# Patient Record
Sex: FEMALE | Race: BLACK OR AFRICAN AMERICAN | NOT HISPANIC OR LATINO | Employment: FULL TIME | URBAN - METROPOLITAN AREA
[De-identification: names, ages, dates, MRNs, and addresses within clinical notes are randomized per-mention and may not be internally consistent; named-entity substitution may affect disease eponyms.]

---

## 2023-02-02 PROBLEM — M79.672 ACUTE PAIN OF LEFT FOOT: Status: ACTIVE | Noted: 2023-02-02

## 2023-02-02 PROBLEM — R23.2 HOT FLASHES: Status: ACTIVE | Noted: 2023-02-02

## 2023-02-02 PROBLEM — R39.9 URINARY SYMPTOM OR SIGN: Status: ACTIVE | Noted: 2023-02-02

## 2023-02-02 PROBLEM — J30.9 ALLERGIC RHINITIS: Status: ACTIVE | Noted: 2023-02-02

## 2023-02-02 PROBLEM — R42 VERTIGO: Status: ACTIVE | Noted: 2023-02-02

## 2023-02-02 PROBLEM — J45.901 ACUTE ASTHMA EXACERBATION (HHS-HCC): Status: ACTIVE | Noted: 2023-02-02

## 2023-02-02 PROBLEM — S46.912A STRAIN OF LEFT SHOULDER: Status: ACTIVE | Noted: 2023-02-02

## 2023-02-02 PROBLEM — N89.8 VAGINAL DISCHARGE: Status: ACTIVE | Noted: 2023-02-02

## 2023-02-02 PROBLEM — I10 ESSENTIAL HYPERTENSION: Status: ACTIVE | Noted: 2023-02-02

## 2023-02-02 PROBLEM — L03.90 CELLULITIS: Status: ACTIVE | Noted: 2023-02-02

## 2023-02-02 PROBLEM — S49.90XA SHOULDER INJURY, INITIAL ENCOUNTER: Status: ACTIVE | Noted: 2023-02-02

## 2023-02-02 PROBLEM — T83.32XA IUD STRINGS LOST: Status: ACTIVE | Noted: 2023-02-02

## 2023-02-02 PROBLEM — L03.012: Status: ACTIVE | Noted: 2023-02-02

## 2023-02-02 PROBLEM — N76.0 VAGINITIS: Status: ACTIVE | Noted: 2023-02-02

## 2023-02-02 PROBLEM — L30.9 DERMATITIS: Status: ACTIVE | Noted: 2023-02-02

## 2023-02-02 PROBLEM — R63.5 ABNORMAL WEIGHT GAIN: Status: ACTIVE | Noted: 2023-02-02

## 2023-02-02 PROBLEM — R53.83 FATIGUE: Status: ACTIVE | Noted: 2023-02-02

## 2023-02-02 PROBLEM — M25.50 JOINT PAIN: Status: ACTIVE | Noted: 2023-02-02

## 2023-02-02 PROBLEM — N89.8 VAGINAL PRURITUS: Status: ACTIVE | Noted: 2023-02-02

## 2023-02-02 PROBLEM — B35.9 RINGWORM: Status: ACTIVE | Noted: 2023-02-02

## 2023-02-02 PROBLEM — F17.200 SMOKING: Status: ACTIVE | Noted: 2023-02-02

## 2023-02-02 PROBLEM — R07.9 CHEST PAIN: Status: ACTIVE | Noted: 2023-02-02

## 2023-02-02 PROBLEM — E66.9 OBESITY: Status: ACTIVE | Noted: 2023-02-02

## 2023-02-02 PROBLEM — J06.9 ACUTE UPPER RESPIRATORY INFECTION: Status: ACTIVE | Noted: 2023-02-02

## 2023-02-02 PROBLEM — R06.83 SNORING: Status: ACTIVE | Noted: 2023-02-02

## 2023-02-02 PROBLEM — E55.9 VITAMIN D DEFICIENCY: Status: ACTIVE | Noted: 2023-02-02

## 2023-02-02 PROBLEM — F17.200 SMOKING: Status: RESOLVED | Noted: 2023-02-02 | Resolved: 2023-02-02

## 2023-02-02 PROBLEM — R05.9 COUGH: Status: ACTIVE | Noted: 2023-02-02

## 2023-02-02 PROBLEM — R19.00 ABDOMINAL MASS: Status: ACTIVE | Noted: 2023-02-02

## 2023-02-02 PROBLEM — G56.00 CARPAL TUNNEL SYNDROME: Status: ACTIVE | Noted: 2023-02-02

## 2023-02-02 PROBLEM — R06.2 WHEEZING: Status: ACTIVE | Noted: 2023-02-02

## 2023-02-02 RX ORDER — AMLODIPINE BESYLATE 10 MG/1
1 TABLET ORAL DAILY
COMMUNITY

## 2023-02-02 RX ORDER — LEVONORGESTREL 52 MG/1
1 INTRAUTERINE DEVICE INTRAUTERINE ONCE
COMMUNITY

## 2023-03-16 ENCOUNTER — APPOINTMENT (OUTPATIENT)
Dept: PRIMARY CARE | Facility: CLINIC | Age: 43
End: 2023-03-16

## 2023-12-12 ENCOUNTER — OFFICE VISIT (OUTPATIENT)
Dept: OBSTETRICS AND GYNECOLOGY | Facility: CLINIC | Age: 43
End: 2023-12-12
Payer: COMMERCIAL

## 2023-12-12 VITALS
WEIGHT: 220 LBS | DIASTOLIC BLOOD PRESSURE: 80 MMHG | BODY MASS INDEX: 34.53 KG/M2 | HEIGHT: 67 IN | SYSTOLIC BLOOD PRESSURE: 120 MMHG

## 2023-12-12 DIAGNOSIS — Z01.419 ENCOUNTER FOR ANNUAL ROUTINE GYNECOLOGICAL EXAMINATION: Primary | ICD-10-CM

## 2023-12-12 DIAGNOSIS — T83.32XA INTRAUTERINE CONTRACEPTIVE DEVICE THREADS LOST, INITIAL ENCOUNTER: ICD-10-CM

## 2023-12-12 PROCEDURE — 99386 PREV VISIT NEW AGE 40-64: CPT | Performed by: OBSTETRICS & GYNECOLOGY

## 2023-12-12 PROCEDURE — 3074F SYST BP LT 130 MM HG: CPT | Performed by: OBSTETRICS & GYNECOLOGY

## 2023-12-12 PROCEDURE — 3079F DIAST BP 80-89 MM HG: CPT | Performed by: OBSTETRICS & GYNECOLOGY

## 2023-12-12 PROCEDURE — 88175 CYTOPATH C/V AUTO FLUID REDO: CPT

## 2023-12-12 PROCEDURE — 87624 HPV HI-RISK TYP POOLED RSLT: CPT

## 2023-12-12 ASSESSMENT — PAIN SCALES - GENERAL: PAINLEVEL: 0-NO PAIN

## 2023-12-12 NOTE — PROGRESS NOTES
43-year-old obese -1-4-2 -American woman presents today for annual GYN exam.  She has a Mirena IUD in place.    Subjective   Patient ID: Kaity Horn is a 43 y.o. female who presents for Annual Exam (Pap: 2018 WNL HPV (NEG)/Mammo: 2023 WNL//Chaperon accepted:  Leah Escobar CMA ).  Objective   Physical Exam  Exam conducted with a chaperone present.   Constitutional:       Appearance: She is obese.   HENT:      Head: Normocephalic.      Right Ear: External ear normal.      Left Ear: External ear normal.      Nose: Nose normal.      Mouth/Throat:      Mouth: Mucous membranes are moist.   Eyes:      Extraocular Movements: Extraocular movements intact.      Pupils: Pupils are equal, round, and reactive to light.   Cardiovascular:      Rate and Rhythm: Normal rate.      Heart sounds: Normal heart sounds.   Pulmonary:      Effort: Pulmonary effort is normal.      Breath sounds: Normal breath sounds.   Chest:   Breasts:     Right: Normal. No mass or skin change.      Left: Normal. No mass or skin change.   Abdominal:      Palpations: Abdomen is soft.   Genitourinary:     General: Normal vulva.      Labia:         Right: No lesion.         Left: No lesion.       Vagina: Normal.      Cervix: No cervical motion tenderness or lesion.      Uterus: Normal. Not enlarged and not tender.       Adnexa: Right adnexa normal and left adnexa normal.        Right: No mass, tenderness or fullness.          Left: No mass, tenderness or fullness.        Comments: IUD strings not seen  Uterus enlarged and consistent with fibroids  Musculoskeletal:         General: Normal range of motion.      Cervical back: Normal range of motion and neck supple.   Skin:     General: Skin is warm and dry.   Neurological:      General: No focal deficit present.      Mental Status: She is alert and oriented to person, place, and time.      Cranial Nerves: No cranial nerve deficit.   Psychiatric:         Mood and Affect: Mood normal.          Behavior: Behavior normal.         Thought Content: Thought content normal.         Judgment: Judgment normal.       A/P: APE     -  Pap sent     -  Mammogram     -  Colonoscopy     -  PCP F/U     -  US for IUD placement

## 2023-12-12 NOTE — PATIENT INSTRUCTIONS
Thanks for coming in today for your annual GYN exam.    A Pap smear was sent.  Results should be available in the next few weeks.  However, if you have been unable to review the results by the middle of the next month please give the office a call.    Arrange to have a mammogram performed once a year.    Follow-up with your PCP and other healthcare specialist as needed.    Have an ultrasound performed to check your IUD placement.  Please use a backup form of contraception if you are sexually active before this can be performed.    Arrange to have a colonoscopy performed.    Feel free to call the office with any problems, questions or concerns prior to your next scheduled visit.

## 2023-12-20 ENCOUNTER — HOSPITAL ENCOUNTER (OUTPATIENT)
Dept: RADIOLOGY | Facility: CLINIC | Age: 43
Discharge: HOME | End: 2023-12-20
Payer: COMMERCIAL

## 2023-12-20 DIAGNOSIS — T83.32XA INTRAUTERINE CONTRACEPTIVE DEVICE THREADS LOST, INITIAL ENCOUNTER: ICD-10-CM

## 2023-12-20 PROCEDURE — 76856 US EXAM PELVIC COMPLETE: CPT | Performed by: RADIOLOGY

## 2023-12-20 PROCEDURE — 76830 TRANSVAGINAL US NON-OB: CPT | Performed by: RADIOLOGY

## 2023-12-20 PROCEDURE — 76856 US EXAM PELVIC COMPLETE: CPT

## 2024-01-02 LAB
CYTOLOGY CMNT CVX/VAG CYTO-IMP: NORMAL
HPV HR 12 DNA GENITAL QL NAA+PROBE: NEGATIVE
HPV HR GENOTYPES PNL CVX NAA+PROBE: NEGATIVE
HPV16 DNA SPEC QL NAA+PROBE: NEGATIVE
HPV18 DNA SPEC QL NAA+PROBE: NEGATIVE
LAB AP CONTRACEPTIVE HISTORY: NORMAL
LAB AP HPV GENOTYPE QUESTION: YES
LAB AP HPV HR: NORMAL
LABORATORY COMMENT REPORT: NORMAL
PATH REPORT.TOTAL CANCER: NORMAL

## 2024-06-03 ENCOUNTER — HOSPITAL ENCOUNTER (EMERGENCY)
Facility: HOSPITAL | Age: 44
Discharge: LEFT BEFORE DISCHARGE | End: 2024-06-04
Payer: COMMERCIAL

## 2024-06-03 VITALS
DIASTOLIC BLOOD PRESSURE: 69 MMHG | TEMPERATURE: 97.3 F | WEIGHT: 215 LBS | HEART RATE: 95 BPM | HEIGHT: 67 IN | BODY MASS INDEX: 33.74 KG/M2 | RESPIRATION RATE: 16 BRPM | SYSTOLIC BLOOD PRESSURE: 135 MMHG | OXYGEN SATURATION: 100 %

## 2024-06-03 PROCEDURE — 99285 EMERGENCY DEPT VISIT HI MDM: CPT | Performed by: PHYSICIAN ASSISTANT

## 2024-06-03 PROCEDURE — 4500999001 HC ED NO CHARGE

## 2024-06-03 ASSESSMENT — COLUMBIA-SUICIDE SEVERITY RATING SCALE - C-SSRS
6. HAVE YOU EVER DONE ANYTHING, STARTED TO DO ANYTHING, OR PREPARED TO DO ANYTHING TO END YOUR LIFE?: NO
1. IN THE PAST MONTH, HAVE YOU WISHED YOU WERE DEAD OR WISHED YOU COULD GO TO SLEEP AND NOT WAKE UP?: NO
2. HAVE YOU ACTUALLY HAD ANY THOUGHTS OF KILLING YOURSELF?: NO

## 2024-06-03 NOTE — ED TRIAGE NOTES
Patient states that she has been having intermittant dizziness.  She states that today she was feeling dizzy and shaky..   states that this has been ongoing for a few weeks.  Here for joey.

## 2024-06-04 ENCOUNTER — HOSPITAL ENCOUNTER (EMERGENCY)
Facility: HOSPITAL | Age: 44
Discharge: HOME | End: 2024-06-04
Payer: COMMERCIAL

## 2024-06-04 ENCOUNTER — APPOINTMENT (OUTPATIENT)
Dept: RADIOLOGY | Facility: HOSPITAL | Age: 44
End: 2024-06-04
Payer: COMMERCIAL

## 2024-06-04 ENCOUNTER — CLINICAL SUPPORT (OUTPATIENT)
Dept: EMERGENCY MEDICINE | Facility: HOSPITAL | Age: 44
End: 2024-06-04
Payer: COMMERCIAL

## 2024-06-04 VITALS
OXYGEN SATURATION: 100 % | HEART RATE: 100 BPM | DIASTOLIC BLOOD PRESSURE: 94 MMHG | WEIGHT: 215 LBS | BODY MASS INDEX: 33.74 KG/M2 | TEMPERATURE: 98 F | RESPIRATION RATE: 17 BRPM | HEIGHT: 67 IN | SYSTOLIC BLOOD PRESSURE: 145 MMHG

## 2024-06-04 DIAGNOSIS — R42 LIGHTHEADEDNESS: Primary | ICD-10-CM

## 2024-06-04 DIAGNOSIS — R53.1 WEAKNESS GENERALIZED: ICD-10-CM

## 2024-06-04 LAB
ALBUMIN SERPL BCP-MCNC: 4.2 G/DL (ref 3.4–5)
ALP SERPL-CCNC: 45 U/L (ref 33–110)
ALT SERPL W P-5'-P-CCNC: 59 U/L (ref 7–45)
ANION GAP SERPL CALC-SCNC: 17 MMOL/L (ref 10–20)
AST SERPL W P-5'-P-CCNC: 64 U/L (ref 9–39)
BASOPHILS # BLD AUTO: 0.03 X10*3/UL (ref 0–0.1)
BASOPHILS NFR BLD AUTO: 0.6 %
BILIRUB SERPL-MCNC: 1.1 MG/DL (ref 0–1.2)
BUN SERPL-MCNC: 8 MG/DL (ref 6–23)
CALCIUM SERPL-MCNC: 9.7 MG/DL (ref 8.6–10.6)
CHLORIDE SERPL-SCNC: 100 MMOL/L (ref 98–107)
CO2 SERPL-SCNC: 26 MMOL/L (ref 21–32)
CREAT SERPL-MCNC: 0.83 MG/DL (ref 0.5–1.05)
EGFRCR SERPLBLD CKD-EPI 2021: 89 ML/MIN/1.73M*2
EOSINOPHIL # BLD AUTO: 0.18 X10*3/UL (ref 0–0.7)
EOSINOPHIL NFR BLD AUTO: 3.9 %
ERYTHROCYTE [DISTWIDTH] IN BLOOD BY AUTOMATED COUNT: 13.2 % (ref 11.5–14.5)
GLUCOSE SERPL-MCNC: 82 MG/DL (ref 74–99)
HCT VFR BLD AUTO: 39.2 % (ref 36–46)
HETEROPH AB SERPLBLD QL IA.RAPID: NEGATIVE
HGB BLD-MCNC: 13.9 G/DL (ref 12–16)
IMM GRANULOCYTES # BLD AUTO: 0.01 X10*3/UL (ref 0–0.7)
IMM GRANULOCYTES NFR BLD AUTO: 0.2 % (ref 0–0.9)
LYMPHOCYTES # BLD AUTO: 1.4 X10*3/UL (ref 1.2–4.8)
LYMPHOCYTES NFR BLD AUTO: 30.2 %
MAGNESIUM SERPL-MCNC: 1.72 MG/DL (ref 1.6–2.4)
MCH RBC QN AUTO: 35 PG (ref 26–34)
MCHC RBC AUTO-ENTMCNC: 35.5 G/DL (ref 32–36)
MCV RBC AUTO: 99 FL (ref 80–100)
MONOCYTES # BLD AUTO: 0.49 X10*3/UL (ref 0.1–1)
MONOCYTES NFR BLD AUTO: 10.6 %
NEUTROPHILS # BLD AUTO: 2.52 X10*3/UL (ref 1.2–7.7)
NEUTROPHILS NFR BLD AUTO: 54.5 %
NRBC BLD-RTO: 0 /100 WBCS (ref 0–0)
PLATELET # BLD AUTO: 188 X10*3/UL (ref 150–450)
POTASSIUM SERPL-SCNC: 3.9 MMOL/L (ref 3.5–5.3)
PROT SERPL-MCNC: 8 G/DL (ref 6.4–8.2)
RBC # BLD AUTO: 3.97 X10*6/UL (ref 4–5.2)
SODIUM SERPL-SCNC: 139 MMOL/L (ref 136–145)
TSH SERPL-ACNC: 2.27 MIU/L (ref 0.44–3.98)
WBC # BLD AUTO: 4.6 X10*3/UL (ref 4.4–11.3)

## 2024-06-04 PROCEDURE — 93005 ELECTROCARDIOGRAM TRACING: CPT

## 2024-06-04 PROCEDURE — 36415 COLL VENOUS BLD VENIPUNCTURE: CPT | Performed by: PHYSICIAN ASSISTANT

## 2024-06-04 PROCEDURE — 86308 HETEROPHILE ANTIBODY SCREEN: CPT | Performed by: PHYSICIAN ASSISTANT

## 2024-06-04 PROCEDURE — 84075 ASSAY ALKALINE PHOSPHATASE: CPT | Performed by: PHYSICIAN ASSISTANT

## 2024-06-04 PROCEDURE — 70450 CT HEAD/BRAIN W/O DYE: CPT | Performed by: RADIOLOGY

## 2024-06-04 PROCEDURE — 83735 ASSAY OF MAGNESIUM: CPT | Performed by: PHYSICIAN ASSISTANT

## 2024-06-04 PROCEDURE — 85025 COMPLETE CBC W/AUTO DIFF WBC: CPT | Performed by: PHYSICIAN ASSISTANT

## 2024-06-04 PROCEDURE — 99285 EMERGENCY DEPT VISIT HI MDM: CPT | Mod: 25

## 2024-06-04 PROCEDURE — 84443 ASSAY THYROID STIM HORMONE: CPT | Performed by: PHYSICIAN ASSISTANT

## 2024-06-04 PROCEDURE — 70450 CT HEAD/BRAIN W/O DYE: CPT

## 2024-06-04 ASSESSMENT — COLUMBIA-SUICIDE SEVERITY RATING SCALE - C-SSRS
1. IN THE PAST MONTH, HAVE YOU WISHED YOU WERE DEAD OR WISHED YOU COULD GO TO SLEEP AND NOT WAKE UP?: NO
2. HAVE YOU ACTUALLY HAD ANY THOUGHTS OF KILLING YOURSELF?: NO
6. HAVE YOU EVER DONE ANYTHING, STARTED TO DO ANYTHING, OR PREPARED TO DO ANYTHING TO END YOUR LIFE?: NO

## 2024-06-04 ASSESSMENT — PAIN - FUNCTIONAL ASSESSMENT: PAIN_FUNCTIONAL_ASSESSMENT: 0-10

## 2024-06-04 ASSESSMENT — PAIN SCALES - GENERAL: PAINLEVEL_OUTOF10: 0 - NO PAIN

## 2024-06-04 NOTE — DISCHARGE INSTRUCTIONS
Your liver enzymes are mildly elevated, this could be secondary to alcohol use, recommend discontinuation and slowly weaning yourself off.  This could also be secondary to mono, follow-up on your results via MyChart.

## 2024-06-04 NOTE — ED PROVIDER NOTES
"HPI   Chief Complaint   Patient presents with    Dizziness       Patient is a 43-year-old female with history of asthma, cellulitis, cough, hypertension, ringworm who presents today for evaluation of generalized weakness, difficulty sleeping, intermittent nausea and dizziness as well as lethargy.  She also had an episode of left hand tremoring that occurred yesterday.  Patient states has been experiencing most of the symptoms for the past 6 months, she describes them as \"lethargic\" and like \"she does not feel like herself\".  Patient denies any chest pain shortness of breath, vomiting, diarrhea, abdominal pain.  Denies any urinary symptoms.  Patient denies any unexplained weight gain or weight loss but states she has been watching her weight for the last year and has therefore lost an expected amount of weight, states she is eating between 1500 to 1800 matthew/day.  Patient states she is eating a balanced diet.  Patient states she has difficulty falling asleep without the use of melatonin.  Patient states that right before going to bed she feels dizzy, she describes it as a spinning sensation however this only occurs before sleep but not otherwise.  Patient denies any fevers or chills, urinary symptoms, URI symptoms.  Denies any chest pain or shortness of breath.  Denies any unexplained hair loss.  She states yesterday she was sitting in her car and her left hand started trembling, she states she has had it before on each hand.  Patient believes her something neurological going on.  She is not currently experiencing the trembling currently.                          Little Coma Scale Score: 15                     Patient History   Past Medical History:   Diagnosis Date    Allergy status to unspecified drugs, medicaments and biological substances     History of seasonal allergies    Displacement of intrauterine contraceptive device, initial encounter     IUD strings lost    Encounter for screening for infections with a " predominantly sexual mode of transmission 10/18/2022    Screening for STDs (sexually transmitted diseases)    Morbid (severe) obesity due to excess calories (Multi)     Morbid obesity    Personal history of malignant neoplasm of other female genital organs     Personal history of malignant neoplasm of vulva    Personal history of other diseases of the respiratory system     Personal history of asthma    Personal history of other specified conditions 07/06/2020    History of edema    Personal history of other specified conditions 07/06/2020    History of headache    Unspecified asthma, uncomplicated ([facility])     Childhood asthma     Past Surgical History:   Procedure Laterality Date    APPENDECTOMY  02/28/2014    Appendectomy    OTHER SURGICAL HISTORY  02/28/2014    Ablation Of Vulvar Lesion(S)    SMALL INTESTINE SURGERY  02/28/2014    Small Bowel Resection     Family History   Problem Relation Name Age of Onset    Other (Cardiac disorder) Mother      Hypertension Mother      Hypertension Father      Stroke Paternal Grandmother       Social History     Tobacco Use    Smoking status: Every Day     Types: Cigarettes    Smokeless tobacco: Never   Substance Use Topics    Alcohol use: Yes    Drug use: Never       Physical Exam   ED Triage Vitals [06/04/24 0947]   Temperature Heart Rate Respirations BP   36.7 °C (98 °F) 100 17 (!) 145/94      Pulse Ox Temp Source Heart Rate Source Patient Position   100 % Oral -- --      BP Location FiO2 (%)     -- --       Physical Exam  Vitals and nursing note reviewed.   Constitutional:       General: She is not in acute distress.     Appearance: Normal appearance. She is not toxic-appearing.   HENT:      Head: Normocephalic and atraumatic.      Nose: Nose normal.   Eyes:      Extraocular Movements: Extraocular movements intact.   Cardiovascular:      Rate and Rhythm: Normal rate and regular rhythm.   Pulmonary:      Effort: Pulmonary effort is normal.      Breath sounds: Normal breath  sounds.   Abdominal:      Palpations: Abdomen is soft.   Musculoskeletal:         General: Normal range of motion.      Cervical back: Normal range of motion and neck supple.   Skin:     General: Skin is warm and dry.   Neurological:      General: No focal deficit present.      Mental Status: She is alert and oriented to person, place, and time.      GCS: GCS eye subscore is 4. GCS verbal subscore is 5. GCS motor subscore is 6.      Cranial Nerves: Cranial nerves 2-12 are intact. No cranial nerve deficit, dysarthria or facial asymmetry.      Sensory: Sensation is intact. No sensory deficit.      Motor: Motor function is intact. No weakness.      Comments: No horizontal nystagmus   Psychiatric:         Mood and Affect: Mood normal.         Thought Content: Thought content normal.       CT head wo IV contrast   Final Result   There is no hyperdense acute intracranial hemorrhage or definite   acute cortical infarction.        MACRO:   None.        Signed by: Milton Crespo 6/4/2024 11:26 AM   Dictation workstation:   [id number]        Labs Reviewed   CBC WITH AUTO DIFFERENTIAL - Abnormal       Result Value    WBC 4.6      nRBC 0.0      RBC 3.97 (*)     Hemoglobin 13.9      Hematocrit 39.2      MCV 99      MCH 35.0 (*)     MCHC 35.5      RDW 13.2      Platelets 188      Neutrophils % 54.5      Immature Granulocytes %, Automated 0.2      Lymphocytes % 30.2      Monocytes % 10.6      Eosinophils % 3.9      Basophils % 0.6      Neutrophils Absolute 2.52      Immature Granulocytes Absolute, Automated 0.01      Lymphocytes Absolute 1.40      Monocytes Absolute 0.49      Eosinophils Absolute 0.18      Basophils Absolute 0.03     COMPREHENSIVE METABOLIC PANEL - Abnormal    Glucose 82      Sodium 139      Potassium 3.9      Chloride 100      Bicarbonate 26      Anion Gap 17      Urea Nitrogen 8      Creatinine 0.83      eGFR 89      Calcium 9.7      Albumin 4.2      Alkaline Phosphatase 45      Total Protein 8.0      AST 64 (*)      Bilirubin, Total 1.1      ALT 59 (*)    MAGNESIUM - Normal    Magnesium 1.72     TSH WITH REFLEX TO FREE T4 IF ABNORMAL - Normal    Thyroid Stimulating Hormone 2.27      Narrative:     TSH testing is performed using different testing methodology at [facility] than at other [facility] hospitals. Direct result comparisons should only be made within the same method.     MONONUCLEOSIS SCREEN (HETEROPHILE ANTIBODY) - Normal    Mononucleosis Screen Negative           ED Course & MDM   ED Course as of 06/04/24 1550   Tue Jun 04, 2024   1054 ECG 12 lead  NSR rate of 96, , QRS 82, QTc 480, normal axis, no ischemic changes. [MK]   1236 Eosinophils %: 3.9    Patient updated on results and plan of care, I did notify her of her mildly elevated LFTs, patient states she does drink alcohol about 4 days a week and does drink a pretty significant amount, she is notified that this could be related, Monospot is currently pending however given this and this would not  patient would prefer to follow-up on this on her MyChart results.  She has PCP follow-up. [MK]      ED Course User Index  [MK] Tamica Gallego PA-C         Diagnoses as of 06/04/24 1550   Lightheadedness   Weakness generalized       Medical Decision Making  MDM: Patient is a 43-year-old female who presents today for evaluation of nonspecific symptoms of dizziness which she more so describes as lightheadedness and not vertigo.  Patient has no acute symptoms, all of her symptoms have been going on for 6 months with the exception of the left hand trauma that occurred while she was sitting in the car yesterday.  She denies headaches, denies head trauma, she has no cardiac symptoms of chest pain or shortness of breath.  I did initiate a basic workup including checking TSH, CBC CMP magnesium CT head on this patient, her EKG is nonischemic. Patient CBC and CMP are unremarkable aside from mildly elevated AST and ALT, I did add on a Monospot  which was subsequently negative, patient does drink alcohol, states she drinks about 4 days a week and drinks a large amount, discussed with her that this could be related to that and not actually the result of her symptoms, her magnesium and TSH are both normal, CT head is negative.  As I discussed with the patient, given chronicity of symptoms, there may be additional tests are warranted however it is important to follow-up with the PCP.  I do not suspect any acute or emergent process requiring any further workup, feel that she can safely be discharged at this time.          Procedure  Procedures     Tamica Gallego PA-C  06/04/24 0467

## 2024-06-04 NOTE — ED TRIAGE NOTES
Pt presents with nausea, dizziness, lethargy and trembling hands that started months ago, worsening on yesterday. LWBS on yesterday from [facility] after waiting a long time to seen

## 2024-06-05 LAB
ATRIAL RATE: 96 BPM
P AXIS: 38 DEGREES
P OFFSET: 183 MS
P ONSET: 139 MS
PR INTERVAL: 158 MS
Q ONSET: 218 MS
QRS COUNT: 16 BEATS
QRS DURATION: 82 MS
QT INTERVAL: 380 MS
QTC CALCULATION(BAZETT): 480 MS
QTC FREDERICIA: 444 MS
R AXIS: -7 DEGREES
T AXIS: 2 DEGREES
T OFFSET: 408 MS
VENTRICULAR RATE: 96 BPM

## 2024-11-25 ENCOUNTER — OFFICE VISIT (OUTPATIENT)
Dept: PRIMARY CARE | Facility: CLINIC | Age: 44
End: 2024-11-25
Payer: COMMERCIAL

## 2024-11-25 VITALS
DIASTOLIC BLOOD PRESSURE: 70 MMHG | HEIGHT: 67 IN | BODY MASS INDEX: 33.67 KG/M2 | SYSTOLIC BLOOD PRESSURE: 160 MMHG | HEART RATE: 92 BPM

## 2024-11-25 DIAGNOSIS — F51.04 CHRONIC INSOMNIA: ICD-10-CM

## 2024-11-25 DIAGNOSIS — Z72.0 TOBACCO ABUSE: ICD-10-CM

## 2024-11-25 DIAGNOSIS — R42 DIZZINESS: Primary | ICD-10-CM

## 2024-11-25 DIAGNOSIS — Z13.6 ENCOUNTER FOR SCREENING FOR CORONARY ARTERY DISEASE: ICD-10-CM

## 2024-11-25 DIAGNOSIS — F10.10 ALCOHOL ABUSE: ICD-10-CM

## 2024-11-25 DIAGNOSIS — I10 ESSENTIAL HYPERTENSION: ICD-10-CM

## 2024-11-25 DIAGNOSIS — R74.8 ABNORMAL LIVER ENZYMES: ICD-10-CM

## 2024-11-25 DIAGNOSIS — L70.0 ACNE VULGARIS: ICD-10-CM

## 2024-11-25 PROCEDURE — 99204 OFFICE O/P NEW MOD 45 MIN: CPT | Performed by: INTERNAL MEDICINE

## 2024-11-25 PROCEDURE — 3078F DIAST BP <80 MM HG: CPT | Performed by: INTERNAL MEDICINE

## 2024-11-25 PROCEDURE — 4004F PT TOBACCO SCREEN RCVD TLK: CPT | Performed by: INTERNAL MEDICINE

## 2024-11-25 PROCEDURE — 3077F SYST BP >= 140 MM HG: CPT | Performed by: INTERNAL MEDICINE

## 2024-11-25 RX ORDER — LANOLIN ALCOHOL/MO/W.PET/CERES
100 CREAM (GRAM) TOPICAL DAILY
Qty: 90 TABLET | Refills: 0 | Status: SHIPPED | OUTPATIENT
Start: 2024-11-25 | End: 2025-11-25

## 2024-11-25 RX ORDER — SPIRONOLACTONE 50 MG/1
50 TABLET, FILM COATED ORAL DAILY
COMMUNITY

## 2024-11-25 RX ORDER — HYDROXYZINE HYDROCHLORIDE 25 MG/1
25 TABLET, FILM COATED ORAL NIGHTLY PRN
Qty: 30 TABLET | Refills: 1 | Status: SHIPPED | OUTPATIENT
Start: 2024-11-25 | End: 2024-12-05

## 2024-11-25 RX ORDER — AMLODIPINE BESYLATE 5 MG/1
5 TABLET ORAL DAILY
Qty: 30 TABLET | Refills: 2 | Status: SHIPPED | OUTPATIENT
Start: 2024-11-25 | End: 2025-11-25

## 2024-11-25 RX ORDER — FOLIC ACID 1 MG/1
1 TABLET ORAL DAILY
Qty: 90 TABLET | Refills: 1 | Status: SHIPPED | OUTPATIENT
Start: 2024-11-25 | End: 2025-11-25

## 2024-11-25 NOTE — PROGRESS NOTES
"Subjective   Patient ID: Kaity Horn is a 44 y.o. female who presents for Dizziness.    HPI   Kaity is a 44-year-old -American female who comes to establish primary care.  She has history of hypertension for which she was prescribed amlodipine by her previous provider but she has not been taking this since she was prescribed spironolactone by her dermatologist for acne.  The main reason for patient's visit today is dizziness that has been occurring on and off for over a year.  One of the episodes led to an ED visit in June this year at which time labs done as well as CT brain were unremarkable except for slightly elevated liver enzymes.  Patient denies fever, chills, chest pain, shortness of breath, cough, palpitations, syncope, abdominal pain, nausea, vomiting, diarrhea, melena, rectal bleeding, dysuria, hematuria or mood issues.  She lives alone and does not report any stress in her life currently.  However, patient has been struggling with chronic insomnia for which she has gotten used to drinking alcohol-she has 2 drinks of vodka on a nightly basis.    Patient is not clear as to the reason of her insomnia and denies feeling anxious or depressed.  She has been smoking half a pack of cigarettes a day for about 20 years and has been counseled to quit.  Family history significant for mother with?  Valvular heart disease.  Review of Systems  As per [facility].  Objective   /70 (BP Location: Right arm, Patient Position: Sitting, BP Cuff Size: Large adult)   Pulse 92   Ht 1.702 m (5' 7\")   BMI 33.67 kg/m²     Physical Exam  General - well developed, well appearing, obese, middle-aged -American female in no acute respiratory distress  Eyes - normal sclera and conjunctiva with no pallor or icterus, normal extraocular movements  ENT - normal external auditory canals and tympanic membranes, throat clear with no exudates  Neck - No JVD, thyromegaly or lymphadenopathy, no carotid bruits  Lungs - no " respiratory distress and lungs clear to auscultation bilaterally  Heart - normal S1, S2 with normal heart rate, rhythm and no murmurs   Abdomen - soft, nontender with no masses or organomegaly  Extremities - no cyanosis or pedal edema  Neuro - grossly normal neuro exam with no focal neuro deficits  Psych - normal mental status, mood and affect   Skin - no rashes or ulcers  MSK - normal gait with grossly normal ROM of major joints  Assessment/Plan        1.  Dizziness, chronic-this has been going on for over a year, labs will be repeated (CMP, CBC, vitamin B12 and vitamin D will be ordered), patient has been advised to stop drinking alcohol on a nightly basis, I have advised her to start taking daily thiamine as well as folic acid  2D echo will be ordered to rule out valvular heart disease  2.  Hypertension-amlodipine will be resumed at a dose of 5 mg daily  3.  Elevated liver enzymes-LFTs will be repeated, patient has been counseled to stop drinking alcohol on a nightly basis  4.  Chronic insomnia-hydroxyzine 25 mg nightly will be prescribed  5.  Alcohol abuse-liver enzymes will be repeated, patient has been counseled to stop drinking alcohol on a nightly basis, thiamine and folic acid will be started  6.  Acne vulgaris-patient is on spironolactone  7.  Tobacco abuse-patient has been counseled to cut back on smoking cigarettes  8.  Screening for CAD-CT cardiac scoring will be ordered  Follow-up in 4 to 6 weeks.  45 minutes spent rooming the patient, reviewing records, eliciting history, examining patient, counseling, coordination of care and in documentation.  This note was partially generated using the Dragon voice recognition system. There may be some incorrect words, spelling and punctuation errors that were not corrected prior to committing the note to the patient's medical record.

## 2024-12-06 ENCOUNTER — ANCILLARY PROCEDURE (OUTPATIENT)
Dept: CARDIOLOGY | Facility: CLINIC | Age: 44
End: 2024-12-06
Payer: COMMERCIAL

## 2024-12-06 DIAGNOSIS — R42 DIZZINESS: ICD-10-CM

## 2024-12-06 LAB
AORTIC VALVE PEAK VELOCITY: 1.45 M/S
AV PEAK GRADIENT: 8 MMHG
AVA (PEAK VEL): 2.74 CM2
EJECTION FRACTION APICAL 4 CHAMBER: 54.9
EJECTION FRACTION: 54 %
LEFT ATRIUM VOLUME AREA LENGTH INDEX BSA: 28.4 ML/M2
LEFT VENTRICLE INTERNAL DIMENSION DIASTOLE: 4.6 CM (ref 3.5–6)
LEFT VENTRICULAR OUTFLOW TRACT DIAMETER: 1.98 CM
MITRAL VALVE E/A RATIO: 0.9
RIGHT VENTRICLE FREE WALL PEAK S': 14 CM/S
TRICUSPID ANNULAR PLANE SYSTOLIC EXCURSION: 2.2 CM

## 2024-12-06 PROCEDURE — 93306 TTE W/DOPPLER COMPLETE: CPT

## 2024-12-06 PROCEDURE — 93306 TTE W/DOPPLER COMPLETE: CPT | Performed by: INTERNAL MEDICINE

## 2024-12-18 DIAGNOSIS — I10 ESSENTIAL HYPERTENSION: ICD-10-CM

## 2024-12-18 RX ORDER — AMLODIPINE BESYLATE 5 MG/1
5 TABLET ORAL DAILY
Qty: 90 TABLET | Refills: 0 | Status: SHIPPED | OUTPATIENT
Start: 2024-12-18

## 2024-12-20 ENCOUNTER — HOSPITAL ENCOUNTER (OUTPATIENT)
Dept: RADIOLOGY | Facility: CLINIC | Age: 44
Discharge: HOME | End: 2024-12-20
Payer: COMMERCIAL

## 2024-12-20 VITALS — HEIGHT: 67 IN | BODY MASS INDEX: 33.9 KG/M2 | WEIGHT: 216 LBS

## 2024-12-20 DIAGNOSIS — Z12.31 ENCOUNTER FOR SCREENING MAMMOGRAM FOR MALIGNANT NEOPLASM OF BREAST: ICD-10-CM

## 2024-12-20 DIAGNOSIS — Z01.419 ENCOUNTER FOR ANNUAL ROUTINE GYNECOLOGICAL EXAMINATION: ICD-10-CM

## 2024-12-20 PROCEDURE — 77067 SCR MAMMO BI INCL CAD: CPT

## 2025-01-02 ENCOUNTER — APPOINTMENT (OUTPATIENT)
Dept: PRIMARY CARE | Facility: CLINIC | Age: 45
End: 2025-01-02
Payer: COMMERCIAL

## 2025-02-16 DIAGNOSIS — F10.10 ALCOHOL ABUSE: ICD-10-CM

## 2025-02-17 RX ORDER — LANOLIN ALCOHOL/MO/W.PET/CERES
100 CREAM (GRAM) TOPICAL DAILY
Qty: 90 TABLET | Refills: 0 | OUTPATIENT
Start: 2025-02-17

## 2025-02-20 DIAGNOSIS — F10.10 ALCOHOL ABUSE: ICD-10-CM

## 2025-02-20 RX ORDER — LANOLIN ALCOHOL/MO/W.PET/CERES
100 CREAM (GRAM) TOPICAL DAILY
Qty: 90 TABLET | Refills: 0 | Status: SHIPPED | OUTPATIENT
Start: 2025-02-20

## 2025-02-26 ENCOUNTER — HOSPITAL ENCOUNTER (OUTPATIENT)
Dept: RADIOLOGY | Facility: HOSPITAL | Age: 45
Discharge: HOME | End: 2025-02-26
Payer: COMMERCIAL

## 2025-02-26 DIAGNOSIS — Z13.6 ENCOUNTER FOR SCREENING FOR CORONARY ARTERY DISEASE: ICD-10-CM

## 2025-02-26 PROCEDURE — 75571 CT HRT W/O DYE W/CA TEST: CPT

## 2025-04-02 ENCOUNTER — OFFICE VISIT (OUTPATIENT)
Dept: URGENT CARE | Age: 45
End: 2025-04-02
Payer: COMMERCIAL

## 2025-04-02 VITALS
WEIGHT: 215 LBS | HEIGHT: 66 IN | BODY MASS INDEX: 34.55 KG/M2 | TEMPERATURE: 97.5 F | RESPIRATION RATE: 17 BRPM | HEART RATE: 95 BPM | SYSTOLIC BLOOD PRESSURE: 150 MMHG | DIASTOLIC BLOOD PRESSURE: 97 MMHG

## 2025-04-02 DIAGNOSIS — M10.9 GOUTY ARTHRITIS OF LEFT GREAT TOE: Primary | ICD-10-CM

## 2025-04-02 PROCEDURE — 3077F SYST BP >= 140 MM HG: CPT | Performed by: PHYSICIAN ASSISTANT

## 2025-04-02 PROCEDURE — 3008F BODY MASS INDEX DOCD: CPT | Performed by: PHYSICIAN ASSISTANT

## 2025-04-02 PROCEDURE — 3080F DIAST BP >= 90 MM HG: CPT | Performed by: PHYSICIAN ASSISTANT

## 2025-04-02 PROCEDURE — 99213 OFFICE O/P EST LOW 20 MIN: CPT | Performed by: PHYSICIAN ASSISTANT

## 2025-04-02 RX ORDER — COLCHICINE 0.6 MG/1
TABLET ORAL
Qty: 6 TABLET | Refills: 0 | Status: SHIPPED | OUTPATIENT
Start: 2025-04-02

## 2025-04-02 ASSESSMENT — ENCOUNTER SYMPTOMS
SHORTNESS OF BREATH: 0
ARTHRALGIAS: 1
JOINT SWELLING: 1
COLOR CHANGE: 1
FEVER: 0
DIAPHORESIS: 0
CHILLS: 0

## 2025-04-02 ASSESSMENT — PAIN SCALES - GENERAL: PAINLEVEL_OUTOF10: 9

## 2025-04-02 NOTE — LETTER
April 2, 2025     Patient: Kaity Horn   YOB: 1980   Date of Visit: 4/2/2025       To Whom It May Concern:    Kaity Horn was seen in my clinic on 4/2/2025 at 3:00 pm. Please excuse Kaity for her absence from work on this day to make the appointment. Kaity Horn will need to be out of work on 4/2/25 4/3/25 4/4/25 4/5/25. Kaity Horn may return to work on 4/6/25     If you have any questions or concerns, please don't hesitate to call.         Sincerely,         Terseita Rogers PA-C        CC: No Recipients

## 2025-04-02 NOTE — PROGRESS NOTES
Subjective   Patient ID: Kaity Horn is a 44 y.o. female. They present today with a chief complaint of Gout (gout).    History of Present Illness  Pain, swelling left big toe started Sunday.    Hx of gout, rx cholichicine, resolves.     Denies fever, chills, sweats, chest pain, SOB, nausea, vomiting.                   Past Medical History  Allergies as of 04/02/2025    (No Known Allergies)       (Not in a hospital admission)       Past Medical History:   Diagnosis Date    Allergy status to unspecified drugs, medicaments and biological substances     History of seasonal allergies    Displacement of intrauterine contraceptive device, initial encounter     IUD strings lost    Encounter for screening for infections with a predominantly sexual mode of transmission 10/18/2022    Screening for STDs (sexually transmitted diseases)    Morbid (severe) obesity due to excess calories (Multi)     Morbid obesity    Personal history of malignant neoplasm of other female genital organs     Personal history of malignant neoplasm of vulva    Personal history of other diseases of the respiratory system     Personal history of asthma    Personal history of other specified conditions 07/06/2020    History of edema    Personal history of other specified conditions 07/06/2020    History of headache    Unspecified asthma, uncomplicated ([facility])     Childhood asthma       Past Surgical History:   Procedure Laterality Date    APPENDECTOMY  02/28/2014    Appendectomy    OTHER SURGICAL HISTORY  02/28/2014    Ablation Of Vulvar Lesion(S)    SMALL INTESTINE SURGERY  02/28/2014    Small Bowel Resection        reports that she has been smoking cigarettes. She has never used smokeless tobacco. She reports current alcohol use. She reports that she does not use drugs.    Review of Systems  Review of Systems   Constitutional:  Negative for chills, diaphoresis and fever.   Respiratory:  Negative for shortness of breath.    Cardiovascular:   "Negative for chest pain.   Musculoskeletal:  Positive for arthralgias and joint swelling.   Skin:  Positive for color change.   All other systems reviewed and are negative.                                 Objective    Vitals:    04/02/25 1459   BP: (!) 150/97   BP Location: Left arm   Patient Position: Sitting   BP Cuff Size: Adult   Pulse: 95   Resp: 17   Temp: 36.4 °C (97.5 °F)   TempSrc: Oral   Weight: 97.5 kg (215 lb)   Height: 1.676 m (5' 6\")     No LMP recorded. Patient has had an implant.    Physical Exam  Vitals and nursing note reviewed.   Constitutional:       General: She is not in acute distress.  Cardiovascular:      Rate and Rhythm: Normal rate.   Pulmonary:      Effort: Pulmonary effort is normal.   Musculoskeletal:         General: Tenderness present.      Comments: Right foot: strength 5/5, sensation intact, full ROM. Tender MTP joint #1 with mild swelling. Dark skin; mild erythema, no ecchymosis visualized   Skin:     Findings: Erythema present.   Neurological:      Mental Status: She is alert.         Procedures    Point of Care Test & Imaging Results from this visit  No results found for this visit on 04/02/25.   Imaging  No results found.    Cardiology, Vascular, and Other Imaging  No other imaging results found for the past 2 days      Diagnostic study results (if any) were reviewed by Teresita Rogers PA-C.    Assessment/Plan   Allergies, medications, history, and pertinent labs/EKGs/Imaging reviewed by Teresita Rogers PA-C.       Orders and Diagnoses  There are no diagnoses linked to this encounter.    Medical Admin Record      Patient disposition: Home    Electronically signed by Teresita Rogers PA-C  3:12 PM      "

## 2025-04-03 DIAGNOSIS — I10 ESSENTIAL HYPERTENSION: ICD-10-CM

## 2025-04-03 RX ORDER — AMLODIPINE BESYLATE 5 MG/1
5 TABLET ORAL DAILY
Qty: 30 TABLET | Refills: 0 | Status: SHIPPED | OUTPATIENT
Start: 2025-04-03

## 2025-05-08 DIAGNOSIS — I10 ESSENTIAL HYPERTENSION: ICD-10-CM

## 2025-05-08 RX ORDER — AMLODIPINE BESYLATE 5 MG/1
5 TABLET ORAL DAILY
Qty: 90 TABLET | Refills: 1 | OUTPATIENT
Start: 2025-05-08

## 2025-05-12 DIAGNOSIS — I10 ESSENTIAL HYPERTENSION: ICD-10-CM

## 2025-05-12 RX ORDER — AMLODIPINE BESYLATE 5 MG/1
5 TABLET ORAL DAILY
Qty: 30 TABLET | Refills: 0 | OUTPATIENT
Start: 2025-05-12

## 2025-05-13 ENCOUNTER — OFFICE VISIT (OUTPATIENT)
Dept: URGENT CARE | Age: 45
End: 2025-05-13
Payer: COMMERCIAL

## 2025-05-13 VITALS
TEMPERATURE: 98.1 F | HEART RATE: 82 BPM | DIASTOLIC BLOOD PRESSURE: 83 MMHG | BODY MASS INDEX: 34.55 KG/M2 | OXYGEN SATURATION: 98 % | RESPIRATION RATE: 20 BRPM | SYSTOLIC BLOOD PRESSURE: 136 MMHG | WEIGHT: 215 LBS | HEIGHT: 66 IN

## 2025-05-13 DIAGNOSIS — J01.90 ACUTE NON-RECURRENT SINUSITIS, UNSPECIFIED LOCATION: Primary | ICD-10-CM

## 2025-05-13 RX ORDER — AMOXICILLIN AND CLAVULANATE POTASSIUM 875; 125 MG/1; MG/1
1 TABLET, FILM COATED ORAL 2 TIMES DAILY
Qty: 14 TABLET | Refills: 0 | Status: SHIPPED | OUTPATIENT
Start: 2025-05-13 | End: 2025-05-20

## 2025-05-13 ASSESSMENT — ENCOUNTER SYMPTOMS
DEPRESSION: 0
COUGH: 1
SINUS PAIN: 1
SINUS PRESSURE: 1
OCCASIONAL FEELINGS OF UNSTEADINESS: 0
LOSS OF SENSATION IN FEET: 0
SINUS COMPLAINT: 1

## 2025-05-13 NOTE — PROGRESS NOTES
"Subjective   Patient ID: Kaity Horn is a 44 y.o. female. They present today with a chief complaint of Sinus Problem (Sinus pressure, couple of weeks, congestion. ).    History of Present Illness  Pt presents with nasal congestion, pnd, slight cough and sinus pressure x 2 months. Tried otc cold and flu medication without relief. Denies fever chills cp sob wheezing headache sore throat dizziness ear pain.       History provided by:  Patient  Sinus Problem  Associated symptoms: congestion and cough        Past Medical History  Allergies as of 05/13/2025    (No Known Allergies)       Prescriptions Prior to Admission[1]     Medical History[2]    Surgical History[3]     reports that she has been smoking cigarettes. She has never used smokeless tobacco. She reports current alcohol use. She reports that she does not use drugs.    Review of Systems  Review of Systems   HENT:  Positive for congestion, postnasal drip, sinus pressure and sinus pain.    Respiratory:  Positive for cough.    All other systems reviewed and are negative.                                 Objective    Vitals:    05/13/25 1243   BP: 136/83   Pulse: 82   Resp: 20   Temp: 36.7 °C (98.1 °F)   TempSrc: Oral   SpO2: 98%   Weight: 97.5 kg (215 lb)   Height: 1.676 m (5' 6\")     No LMP recorded. (Menstrual status: IUD).    Physical Exam  Vitals and nursing note reviewed.   Constitutional:       General: She is not in acute distress.     Appearance: Normal appearance. She is not ill-appearing, toxic-appearing or diaphoretic.   HENT:      Head: Normocephalic and atraumatic.      Right Ear: Tympanic membrane, ear canal and external ear normal.      Left Ear: Tympanic membrane, ear canal and external ear normal.      Nose: Congestion present.      Right Nostril: No foreign body, epistaxis, septal hematoma or occlusion.      Left Nostril: No foreign body, epistaxis, septal hematoma or occlusion.      Right Turbinates: Enlarged and swollen. Not pale.      Left " Turbinates: Enlarged and swollen. Not pale.      Right Sinus: No maxillary sinus tenderness or frontal sinus tenderness.      Left Sinus: No maxillary sinus tenderness or frontal sinus tenderness.      Mouth/Throat:      Lips: Pink.      Mouth: Mucous membranes are moist.      Dentition: Normal dentition.      Pharynx: Oropharynx is clear. Uvula midline. No pharyngeal swelling, oropharyngeal exudate, posterior oropharyngeal erythema, uvula swelling or postnasal drip.      Tonsils: No tonsillar exudate or tonsillar abscesses.   Cardiovascular:      Rate and Rhythm: Normal rate and regular rhythm.      Pulses: Normal pulses.      Heart sounds: No murmur heard.  Pulmonary:      Effort: Pulmonary effort is normal. No respiratory distress.      Breath sounds: No wheezing, rhonchi or rales.   Neurological:      Mental Status: She is alert.         Procedures    Point of Care Test & Imaging Results from this visit  No results found for this visit on 05/13/25.   Imaging  No results found.    Cardiology, Vascular, and Other Imaging  No other imaging results found for the past 2 days      Diagnostic study results (if any) were reviewed by Ching Gallo PA-C.    Assessment/Plan   Allergies, medications, history, and pertinent labs/EKGs/Imaging reviewed by Ching Gallo PA-C.           Orders and Diagnoses  Diagnoses and all orders for this visit:  Acute non-recurrent sinusitis, unspecified location  -     amoxicillin-clavulanate (Augmentin) 875-125 mg tablet; Take 1 tablet by mouth 2 times a day for 7 days.      Medical Admin Record      Patient disposition: Home    Electronically signed by Ching Gallo PA-C  1:21 PM           [1] (Not in a hospital admission)  [2]   Past Medical History:  Diagnosis Date    Allergy status to unspecified drugs, medicaments and biological substances     History of seasonal allergies    Displacement of intrauterine contraceptive device, initial encounter     IUD strings lost     Encounter for screening for infections with a predominantly sexual mode of transmission 10/18/2022    Screening for STDs (sexually transmitted diseases)    Morbid (severe) obesity due to excess calories (Multi)     Morbid obesity    Personal history of malignant neoplasm of other female genital organs     Personal history of malignant neoplasm of vulva    Personal history of other diseases of the respiratory system     Personal history of asthma    Personal history of other specified conditions 07/06/2020    History of edema    Personal history of other specified conditions 07/06/2020    History of headache    Unspecified asthma, uncomplicated ([facility]-HCC)     Childhood asthma   [3]   Past Surgical History:  Procedure Laterality Date    APPENDECTOMY  02/28/2014    Appendectomy    OTHER SURGICAL HISTORY  02/28/2014    Ablation Of Vulvar Lesion(S)    SMALL INTESTINE SURGERY  02/28/2014    Small Bowel Resection

## 2025-05-13 NOTE — PATIENT INSTRUCTIONS
PROBIOTICS:     I recommend starting two different probiotics. Probiotics can help replenish beneficial gut bacteria that antibiotics often destroy, reducing the risk of digestive issues that antibiotics may cause such as diarrhea, bloating and C. difficle intestinal infection. You can typically find these at most [employer], [employer], [employer] or on [employer]     1) One containing Lactobacillus Rhamnosus GG. This can help reduce antibiotic associated diarrhea. Ex: Culturelle     2) One containing Saccharomyces Boulardii. This can help prevent antibiotic associated C.difficle intestinal infection. Ex: Florastor     Helpful tips:     *Take probiotic at least 2 hours before or after the antibiotic to prevent the antibiotic from killing the good bacteria     *Continue the probiotic for 1-2 weeks after finishing the antibiotic to help restore gut health     *Include probiotic rich foods like Greek yogurt, kefir, sauerkraut or kimchi for added benefits           sinus supportive care:       I advise rest, fluids, Flonase nasal spray once daily, nasal saline rinses 2-3 times daily, humidifier in bedroom at night, Claritin or Zyrtec once daily for the next two weeks to help with post nasal drip and/or feeling of fullness in the ears from congestion.       Dissolving 1 tsp of salt into warm water and gargling as needed can be helpful for sore throat. Honey can help to coat and soothe the throat as well. Honey is also an excellent cough suppressant. You can swallow 1 tsp honey plain or dissolve it into warm tea/liquid as needed.         Please follow up with your primary provider, go to the emergency room, or return to urgent care if symptoms do not improve or worsen. ?You may schedule an appointment online at [email or web address] or call [phone]. Go to the Emergency Department if symptoms significantly worsen or if you develop symptoms including but not limited to chest pain or shortness of breath.         
show

## 2025-07-29 DIAGNOSIS — I10 ESSENTIAL HYPERTENSION: ICD-10-CM

## 2025-07-29 RX ORDER — AMLODIPINE BESYLATE 5 MG/1
5 TABLET ORAL DAILY
Qty: 90 TABLET | Refills: 0 | Status: SHIPPED | OUTPATIENT
Start: 2025-07-29

## 2025-09-30 ENCOUNTER — APPOINTMENT (OUTPATIENT)
Dept: PRIMARY CARE | Facility: CLINIC | Age: 45
End: 2025-09-30
Payer: COMMERCIAL